# Patient Record
Sex: FEMALE | Race: WHITE
[De-identification: names, ages, dates, MRNs, and addresses within clinical notes are randomized per-mention and may not be internally consistent; named-entity substitution may affect disease eponyms.]

---

## 2018-05-09 ENCOUNTER — HOSPITAL ENCOUNTER (EMERGENCY)
Dept: HOSPITAL 26 - MED | Age: 48
Discharge: HOME | End: 2018-05-09
Payer: MEDICAID

## 2018-05-09 VITALS — DIASTOLIC BLOOD PRESSURE: 86 MMHG | SYSTOLIC BLOOD PRESSURE: 137 MMHG

## 2018-05-09 VITALS — BODY MASS INDEX: 27.31 KG/M2 | HEIGHT: 64 IN | WEIGHT: 160 LBS

## 2018-05-09 VITALS — DIASTOLIC BLOOD PRESSURE: 88 MMHG | SYSTOLIC BLOOD PRESSURE: 139 MMHG
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
## 2018-05-09 DIAGNOSIS — S29.012A: Primary | ICD-10-CM

## 2018-05-09 DIAGNOSIS — S30.1XXA: ICD-10-CM

## 2018-05-09 DIAGNOSIS — E11.9: ICD-10-CM
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
## 2018-05-09 DIAGNOSIS — S80.02XA: ICD-10-CM

## 2018-05-09 NOTE — NUR
Patient discharged with v/s stable. Written and verbal after care instructions 
given and explained. Patient alert, oriented and verbalized understanding of 
instructions. Ambulatory with steady gait. All questions addressed prior to 
discharge. ID band removed. Patient advised to follow up with PMD. Rx of 
naprosyn given. Patient educated on indication of medication including possible 
reaction and side effects. Opportunity to ask questions provided and answered.
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
